# Patient Record
Sex: FEMALE | Race: WHITE | NOT HISPANIC OR LATINO
[De-identification: names, ages, dates, MRNs, and addresses within clinical notes are randomized per-mention and may not be internally consistent; named-entity substitution may affect disease eponyms.]

---

## 2017-01-19 ENCOUNTER — APPOINTMENT (OUTPATIENT)
Dept: UROGYNECOLOGY | Facility: CLINIC | Age: 47
End: 2017-01-19

## 2017-02-13 ENCOUNTER — APPOINTMENT (OUTPATIENT)
Dept: UROGYNECOLOGY | Facility: CLINIC | Age: 47
End: 2017-02-13

## 2017-02-13 VITALS
BODY MASS INDEX: 35.85 KG/M2 | WEIGHT: 210 LBS | HEIGHT: 64 IN | DIASTOLIC BLOOD PRESSURE: 70 MMHG | SYSTOLIC BLOOD PRESSURE: 110 MMHG

## 2017-02-13 DIAGNOSIS — R39.15 URGENCY OF URINATION: ICD-10-CM

## 2017-02-13 DIAGNOSIS — R35.0 FREQUENCY OF MICTURITION: ICD-10-CM

## 2017-03-16 ENCOUNTER — MEDICATION RENEWAL (OUTPATIENT)
Age: 47
End: 2017-03-16

## 2017-04-19 ENCOUNTER — RESULT CHARGE (OUTPATIENT)
Age: 47
End: 2017-04-19

## 2017-04-19 ENCOUNTER — APPOINTMENT (OUTPATIENT)
Dept: UROGYNECOLOGY | Facility: CLINIC | Age: 47
End: 2017-04-19

## 2017-04-19 VITALS
SYSTOLIC BLOOD PRESSURE: 125 MMHG | DIASTOLIC BLOOD PRESSURE: 75 MMHG | BODY MASS INDEX: 35.85 KG/M2 | HEIGHT: 64 IN | WEIGHT: 210 LBS

## 2017-04-19 RX ORDER — OXYBUTYNIN CHLORIDE 5 MG/1
5 TABLET, EXTENDED RELEASE ORAL
Qty: 30 | Refills: 0 | Status: COMPLETED | COMMUNITY
Start: 2017-02-13 | End: 2017-04-19

## 2017-06-26 ENCOUNTER — APPOINTMENT (OUTPATIENT)
Dept: OBGYN | Facility: CLINIC | Age: 47
End: 2017-06-26

## 2017-06-26 VITALS
SYSTOLIC BLOOD PRESSURE: 118 MMHG | BODY MASS INDEX: 36.19 KG/M2 | HEART RATE: 80 BPM | HEIGHT: 64 IN | WEIGHT: 212 LBS | DIASTOLIC BLOOD PRESSURE: 78 MMHG

## 2017-06-26 DIAGNOSIS — Z01.419 ENCOUNTER FOR GYNECOLOGICAL EXAMINATION (GENERAL) (ROUTINE) W/OUT ABNORMAL FINDINGS: ICD-10-CM

## 2017-06-26 LAB
HCG UR QL: NEGATIVE
QUALITY CONTROL: YES

## 2017-06-28 LAB — HPV HIGH+LOW RISK DNA PNL CVX: NEGATIVE

## 2017-07-03 LAB — CYTOLOGY CVX/VAG DOC THIN PREP: NORMAL

## 2017-08-29 ENCOUNTER — APPOINTMENT (OUTPATIENT)
Dept: UROGYNECOLOGY | Facility: CLINIC | Age: 47
End: 2017-08-29
Payer: COMMERCIAL

## 2017-08-29 VITALS
SYSTOLIC BLOOD PRESSURE: 102 MMHG | BODY MASS INDEX: 36.19 KG/M2 | HEIGHT: 64 IN | DIASTOLIC BLOOD PRESSURE: 68 MMHG | WEIGHT: 212 LBS

## 2017-08-29 LAB
BILIRUB UR QL STRIP: NEGATIVE
CLARITY UR: CLEAR
COLLECTION METHOD: NORMAL
GLUCOSE UR-MCNC: NEGATIVE
HCG UR QL: 0.2 EU/DL
HGB UR QL STRIP.AUTO: NORMAL
KETONES UR-MCNC: NEGATIVE
LEUKOCYTE ESTERASE UR QL STRIP: NORMAL
NITRITE UR QL STRIP: NEGATIVE
PH UR STRIP: 5.5
PROT UR STRIP-MCNC: NEGATIVE
SP GR UR STRIP: 1.02

## 2017-08-29 PROCEDURE — 51798 US URINE CAPACITY MEASURE: CPT

## 2017-08-29 PROCEDURE — 99213 OFFICE O/P EST LOW 20 MIN: CPT | Mod: 25

## 2017-08-29 PROCEDURE — 81003 URINALYSIS AUTO W/O SCOPE: CPT | Mod: QW

## 2017-08-29 RX ORDER — OXYBUTYNIN CHLORIDE 10 MG/1
10 TABLET, EXTENDED RELEASE ORAL
Qty: 30 | Refills: 3 | Status: DISCONTINUED | COMMUNITY
Start: 2017-04-19 | End: 2017-08-29

## 2017-11-15 ENCOUNTER — APPOINTMENT (OUTPATIENT)
Dept: UROGYNECOLOGY | Facility: CLINIC | Age: 47
End: 2017-11-15
Payer: COMMERCIAL

## 2017-11-15 VITALS
SYSTOLIC BLOOD PRESSURE: 106 MMHG | DIASTOLIC BLOOD PRESSURE: 72 MMHG | HEIGHT: 64 IN | BODY MASS INDEX: 36.19 KG/M2 | WEIGHT: 212 LBS

## 2017-11-15 LAB
BILIRUB UR QL STRIP: NEGATIVE
CLARITY UR: CLEAR
COLLECTION METHOD: NORMAL
GLUCOSE UR-MCNC: NEGATIVE
HCG UR QL: 0.2 EU/DL
HGB UR QL STRIP.AUTO: NEGATIVE
KETONES UR-MCNC: NEGATIVE
LEUKOCYTE ESTERASE UR QL STRIP: NEGATIVE
NITRITE UR QL STRIP: NEGATIVE
PH UR STRIP: 5.5
PROT UR STRIP-MCNC: NEGATIVE
SP GR UR STRIP: 1.03

## 2017-11-15 PROCEDURE — 51798 US URINE CAPACITY MEASURE: CPT

## 2017-11-15 PROCEDURE — 81003 URINALYSIS AUTO W/O SCOPE: CPT | Mod: QW

## 2017-11-15 PROCEDURE — 99213 OFFICE O/P EST LOW 20 MIN: CPT | Mod: 25

## 2020-05-19 ENCOUNTER — TRANSCRIPTION ENCOUNTER (OUTPATIENT)
Age: 50
End: 2020-05-19

## 2021-12-23 ENCOUNTER — APPOINTMENT (OUTPATIENT)
Dept: UROGYNECOLOGY | Facility: CLINIC | Age: 51
End: 2021-12-23
Payer: COMMERCIAL

## 2021-12-23 ENCOUNTER — RESULT CHARGE (OUTPATIENT)
Age: 51
End: 2021-12-23

## 2021-12-23 DIAGNOSIS — R32 UNSPECIFIED URINARY INCONTINENCE: ICD-10-CM

## 2021-12-23 DIAGNOSIS — R35.0 FREQUENCY OF MICTURITION: ICD-10-CM

## 2021-12-23 DIAGNOSIS — R39.15 URGENCY OF URINATION: ICD-10-CM

## 2021-12-23 DIAGNOSIS — F17.200 NICOTINE DEPENDENCE, UNSPECIFIED, UNCOMPLICATED: ICD-10-CM

## 2021-12-23 DIAGNOSIS — Z78.9 OTHER SPECIFIED HEALTH STATUS: ICD-10-CM

## 2021-12-23 LAB
BILIRUB UR QL STRIP: NEGATIVE
CLARITY UR: CLEAR
COLLECTION METHOD: NORMAL
GLUCOSE UR-MCNC: NEGATIVE
HCG UR QL: 0.2 EU/DL
HGB UR QL STRIP.AUTO: NEGATIVE
KETONES UR-MCNC: NEGATIVE
LEUKOCYTE ESTERASE UR QL STRIP: NEGATIVE
NITRITE UR QL STRIP: NEGATIVE
PH UR STRIP: 6
PROT UR STRIP-MCNC: NEGATIVE
SP GR UR STRIP: 1.02

## 2021-12-23 PROCEDURE — 99204 OFFICE O/P NEW MOD 45 MIN: CPT | Mod: 25

## 2021-12-23 PROCEDURE — 51798 US URINE CAPACITY MEASURE: CPT

## 2021-12-23 RX ORDER — BROMPHENIRAMINE MALEATE, PSEUDOEPHEDRINE HYDROCHLORIDE, 2; 30; 10 MG/5ML; MG/5ML; MG/5ML
30-2-10 SYRUP ORAL
Qty: 236 | Refills: 0 | Status: DISCONTINUED | COMMUNITY
Start: 2017-01-07 | End: 2021-12-23

## 2021-12-23 RX ORDER — PREDNISONE 20 MG/1
20 TABLET ORAL
Qty: 6 | Refills: 0 | Status: DISCONTINUED | COMMUNITY
Start: 2017-01-07 | End: 2021-12-23

## 2021-12-23 RX ORDER — AZITHROMYCIN 250 MG/1
250 TABLET, FILM COATED ORAL
Qty: 6 | Refills: 0 | Status: DISCONTINUED | COMMUNITY
Start: 2016-12-29 | End: 2021-12-23

## 2021-12-23 RX ORDER — FLUTICASONE PROPIONATE 50 UG/1
50 SPRAY, METERED NASAL
Qty: 16 | Refills: 0 | Status: DISCONTINUED | COMMUNITY
Start: 2017-01-07 | End: 2021-12-23

## 2021-12-23 RX ORDER — TOPIRAMATE 25 MG/1
25 TABLET, FILM COATED ORAL
Qty: 30 | Refills: 0 | Status: DISCONTINUED | COMMUNITY
Start: 2017-04-03 | End: 2021-12-23

## 2021-12-23 RX ORDER — OXYBUTYNIN CHLORIDE 15 MG/1
15 TABLET, EXTENDED RELEASE ORAL
Qty: 30 | Refills: 5 | Status: DISCONTINUED | COMMUNITY
Start: 2017-08-29 | End: 2021-12-23

## 2021-12-23 RX ORDER — ALBUTEROL SULFATE 90 UG/1
108 (90 BASE) AEROSOL, METERED RESPIRATORY (INHALATION)
Qty: 18 | Refills: 0 | Status: DISCONTINUED | COMMUNITY
Start: 2017-01-07 | End: 2021-12-23

## 2021-12-23 RX ORDER — ESTRADIOL 0.1 MG/G
0.1 CREAM VAGINAL
Qty: 1 | Refills: 2 | Status: ACTIVE | COMMUNITY
Start: 2021-12-23 | End: 1900-01-01

## 2021-12-23 RX ORDER — ALBUTEROL SULFATE 90 UG/1
AEROSOL, METERED RESPIRATORY (INHALATION)
Refills: 0 | Status: ACTIVE | COMMUNITY

## 2021-12-23 NOTE — DISCUSSION/SUMMARY
[FreeTextEntry1] : Ms. PARISI is 51 with mild midline cystocele, rectocele, urinary frequency, urgency, leakage of urine, vaginal atrophy and discomfort with intercourse.  We discussed the degree of her prolapse and the significance of vaginal atrophy as it associates with the discomfort she is on feeling.  We discussed the systemic side effects of a small amount of vaginal estrogen and I sent a prescription to her pharmacy.  I referred her for pelvic floor physical therapy and asked her to come back and see me in about 3 months to reevaluate her.  I also ordered a pelvic sonogram to evaluate her internal pelvic organs.  I was able to answer all of her questions.

## 2021-12-23 NOTE — LETTER BODY
[Dear  ___] : Dear ~SIRENA, [I had the pleasure of evaluating your patient, [unfilled]. Thank you for referring Ms. [unfilled] for consultation for ___] : I had the pleasure of evaluating your patient, [unfilled]. Thank you for referring Ms. [unfilled] for consultation for [unfilled]. [Attached please find my note.] : Attached please find my note. [Thank you very much for allowing me to participate in the care of this patient. If you have any questions, please do not hesitate to contact me] : Thank you very much for allowing me to participate in the care of this patient. If you have any questions, please do not hesitate to contact me. [DrJ Carlos  ___] : Dr. COOK

## 2021-12-23 NOTE — PHYSICAL EXAM
[Chaperone Present] : A chaperone was present in the examining room during all aspects of the physical examination [No Acute Distress] : in no acute distress [Well developed] : well developed [Well Nourished] : ~L well nourished [Oriented x3] : oriented to person, place, and time [No Edema] : ~T edema was not present [Warm and Dry] : was warm and dry to touch [Normal Gait] : gait was normal [Normal Appearance] : general appearance was normal [Aa ____] : Aa [unfilled] [Ba ____] : Ba [unfilled] [C ____] : C [unfilled] [GH ____] : GH [unfilled] [PB ____] : PB [unfilled] [TVL ____] : TVL  [unfilled] [Ap ____] : Ap [unfilled] [Bp ____] : Bp [unfilled] [D ____] : D [unfilled] [Normal] : no abnormalities [Cough] : no cough [Tenderness] : ~T no ~M abdominal tenderness observed [Distended] : not distended [Hernia] : no hernia observed

## 2021-12-23 NOTE — HISTORY OF PRESENT ILLNESS
[FreeTextEntry1] : 52 yo  female with complaints of prolapse. She first noticed about a year ago. She had an episode in October where she could not have a BM. She had to push so hard and it got worse after this. She is also having discomfort with intercourse. Saw GYN and told her bladder was dropping. She sometimes feels some difficulty emptying her bladder. She is still using Impressa when she exercises. Is not longer taking meds for OAB. To some degree the urgency and frequency has improved. She lost 40 lbs with dieting. She has some vaginal dryness. SHe used to have IBS and loose stool, now she is more constipated.

## 2021-12-23 NOTE — OB HISTORY
[Vaginal ___] : [unfilled] vaginal delivery(s) [Last Pap Smear ___] : date of last pap smear was on [unfilled] [Sexually Active] : sexually active [Approximately ___ (Month)] : the LMP was approximately [unfilled] month(s) ago [Abnormal Pap Smear] : normal pap smear [Taking Estrogens] : is not taking estrogen replacement [FreeTextEntry1] : Largest baby 8#.

## 2021-12-23 NOTE — REASON FOR VISIT
[Urinary Incontinence] : urinary incontinence [Pelvic Organ Prolapse] : pelvic organ prolapse [Problems With Defecation] : problems with defecation

## 2022-05-16 ENCOUNTER — APPOINTMENT (OUTPATIENT)
Dept: UROGYNECOLOGY | Facility: CLINIC | Age: 52
End: 2022-05-16
Payer: COMMERCIAL

## 2022-05-16 DIAGNOSIS — R32 UNSPECIFIED URINARY INCONTINENCE: ICD-10-CM

## 2022-05-16 PROCEDURE — 51798 US URINE CAPACITY MEASURE: CPT

## 2022-05-16 PROCEDURE — 99213 OFFICE O/P EST LOW 20 MIN: CPT | Mod: 25

## 2022-05-16 NOTE — HISTORY OF PRESENT ILLNESS
[FreeTextEntry1] : Syeda has mild to moderate prolapse and pain with intercourse. She was given estrogen cream and a referral for PT at her last visit. She also had a normal pelvic U/S in February. She feels some improvement after PT. No change in the discomfort with intercourse. Has a loop recorder for tachycardia. No change in her urinary habits nor has her bowel function. Has been doing PT for about 6 weeks. Does not feel the prolapse is any better or worse. She was using a dilator. She feels more flexibility in her pelvic floor.

## 2022-05-16 NOTE — DISCUSSION/SUMMARY
[FreeTextEntry1] : Stable mild prolapse. Still having discomfort with intercourse. I encouraged her to continue with PT. Followup in 6 months.

## 2022-08-02 ENCOUNTER — NON-APPOINTMENT (OUTPATIENT)
Age: 52
End: 2022-08-02

## 2022-09-04 ENCOUNTER — NON-APPOINTMENT (OUTPATIENT)
Age: 52
End: 2022-09-04

## 2022-11-03 ENCOUNTER — NON-APPOINTMENT (OUTPATIENT)
Age: 52
End: 2022-11-03

## 2022-11-14 ENCOUNTER — RESULT CHARGE (OUTPATIENT)
Age: 52
End: 2022-11-14

## 2022-11-14 ENCOUNTER — APPOINTMENT (OUTPATIENT)
Dept: UROGYNECOLOGY | Facility: CLINIC | Age: 52
End: 2022-11-14
Payer: COMMERCIAL

## 2022-11-14 VITALS
DIASTOLIC BLOOD PRESSURE: 69 MMHG | HEIGHT: 64 IN | SYSTOLIC BLOOD PRESSURE: 106 MMHG | WEIGHT: 177 LBS | BODY MASS INDEX: 30.22 KG/M2

## 2022-11-14 DIAGNOSIS — N81.9 FEMALE GENITAL PROLAPSE, UNSPECIFIED: ICD-10-CM

## 2022-11-14 DIAGNOSIS — N95.2 POSTMENOPAUSAL ATROPHIC VAGINITIS: ICD-10-CM

## 2022-11-14 DIAGNOSIS — R37 SEXUAL DYSFUNCTION, UNSPECIFIED: ICD-10-CM

## 2022-11-14 LAB
BILIRUB UR QL STRIP: NEGATIVE
CLARITY UR: CLEAR
COLLECTION METHOD: NORMAL
GLUCOSE UR-MCNC: NEGATIVE
HCG UR QL: 0.2 EU/DL
HGB UR QL STRIP.AUTO: NEGATIVE
KETONES UR-MCNC: NEGATIVE
LEUKOCYTE ESTERASE UR QL STRIP: NEGATIVE
NITRITE UR QL STRIP: NEGATIVE
PH UR STRIP: 5.5
PROT UR STRIP-MCNC: NEGATIVE
SP GR UR STRIP: 1.02

## 2022-11-14 PROCEDURE — 99213 OFFICE O/P EST LOW 20 MIN: CPT | Mod: 25

## 2022-11-14 PROCEDURE — 51798 US URINE CAPACITY MEASURE: CPT

## 2022-11-14 PROCEDURE — 81003 URINALYSIS AUTO W/O SCOPE: CPT | Mod: QW

## 2022-11-14 NOTE — HISTORY OF PRESENT ILLNESS
[FreeTextEntry1] : Syeda has mild to moderate prolapse that has been stable. She also has some discomfort with intercourse. She had a trial with PT and after 6 weeks didn't feel much difference in her symptoms. She presents today for followup. She had continued with PT and does not feel it made a big difference. She does have to splint to have a BM. She continues to have trouble with intercourse. She feels something rubbing her underwear. She is using impressa when she works out. Has not been using estrogen cream.

## 2022-11-14 NOTE — DISCUSSION/SUMMARY
[FreeTextEntry1] : Slightly worsening of rectocele. We discussed her pelvic muscle spasm as the likely cause of her pain. We talked about treatment options. I sent an Rx for 5mg valium suppositories to be compounded. We discussed what would be involved in a posterior repair. We talked about risk of worsening dyspareunia. I encourage her to continue with the estrogen cream. I was able to answer all of her questions. i asked her to comeback in 3 months. I told her she can call for refills on the valium if it helps.

## 2023-05-06 ENCOUNTER — NON-APPOINTMENT (OUTPATIENT)
Age: 53
End: 2023-05-06

## 2023-05-12 ENCOUNTER — NON-APPOINTMENT (OUTPATIENT)
Age: 53
End: 2023-05-12

## 2023-05-23 ENCOUNTER — APPOINTMENT (OUTPATIENT)
Dept: UROGYNECOLOGY | Facility: CLINIC | Age: 53
End: 2023-05-23

## 2023-10-27 ENCOUNTER — APPOINTMENT (OUTPATIENT)
Dept: ORTHOPEDIC SURGERY | Facility: CLINIC | Age: 53
End: 2023-10-27
Payer: COMMERCIAL

## 2023-10-27 DIAGNOSIS — M77.8 OTHER ENTHESOPATHIES, NOT ELSEWHERE CLASSIFIED: ICD-10-CM

## 2023-10-27 DIAGNOSIS — Z87.898 PERSONAL HISTORY OF OTHER SPECIFIED CONDITIONS: ICD-10-CM

## 2023-10-27 DIAGNOSIS — M70.52 OTHER BURSITIS OF KNEE, LEFT KNEE: ICD-10-CM

## 2023-10-27 PROCEDURE — 99213 OFFICE O/P EST LOW 20 MIN: CPT

## 2023-10-27 PROCEDURE — 73110 X-RAY EXAM OF WRIST: CPT | Mod: RT

## 2023-10-27 PROCEDURE — 73564 X-RAY EXAM KNEE 4 OR MORE: CPT | Mod: LT

## 2023-10-31 ENCOUNTER — RESULT REVIEW (OUTPATIENT)
Age: 53
End: 2023-10-31

## 2023-11-09 ENCOUNTER — APPOINTMENT (OUTPATIENT)
Dept: ORTHOPEDIC SURGERY | Facility: CLINIC | Age: 53
End: 2023-11-09
Payer: COMMERCIAL

## 2023-11-09 VITALS — HEIGHT: 64 IN | WEIGHT: 177 LBS | BODY MASS INDEX: 30.22 KG/M2

## 2023-11-09 DIAGNOSIS — M71.22 SYNOVIAL CYST OF POPLITEAL SPACE [BAKER], LEFT KNEE: ICD-10-CM

## 2023-11-09 PROCEDURE — 99214 OFFICE O/P EST MOD 30 MIN: CPT

## 2023-11-09 RX ORDER — MONTELUKAST SODIUM 10 MG/1
10 TABLET, FILM COATED ORAL
Refills: 0 | Status: ACTIVE | COMMUNITY

## 2023-11-09 RX ORDER — DIAZEPAM 100 %
POWDER (GRAM) MISCELLANEOUS
Qty: 30 | Refills: 0 | Status: DISCONTINUED | COMMUNITY
Start: 2022-11-14 | End: 2023-11-09

## 2023-11-09 RX ORDER — FLUTICASONE FUROATE, UMECLIDINIUM BROMIDE AND VILANTEROL TRIFENATATE 100; 62.5; 25 UG/1; UG/1; UG/1
100-62.5-25 POWDER RESPIRATORY (INHALATION)
Refills: 0 | Status: ACTIVE | COMMUNITY

## 2023-11-20 ENCOUNTER — APPOINTMENT (OUTPATIENT)
Dept: ORTHOPEDIC SURGERY | Facility: CLINIC | Age: 53
End: 2023-11-20

## 2024-10-12 ENCOUNTER — OFFICE (OUTPATIENT)
Dept: URBAN - METROPOLITAN AREA CLINIC 113 | Facility: CLINIC | Age: 54
Setting detail: OPHTHALMOLOGY
End: 2024-10-12
Payer: COMMERCIAL

## 2024-10-12 DIAGNOSIS — H52.7: ICD-10-CM

## 2024-10-12 DIAGNOSIS — H11.153: ICD-10-CM

## 2024-10-12 DIAGNOSIS — H43.392: ICD-10-CM

## 2024-10-12 PROBLEM — Z83.511 FAMILY HISTORY OF GLAUCOMA: Status: ACTIVE | Noted: 2024-10-12

## 2024-10-12 PROCEDURE — 92015 DETERMINE REFRACTIVE STATE: CPT | Performed by: OPTOMETRIST

## 2024-10-12 PROCEDURE — 92014 COMPRE OPH EXAM EST PT 1/>: CPT | Performed by: OPTOMETRIST

## 2024-10-12 PROCEDURE — 92250 FUNDUS PHOTOGRAPHY W/I&R: CPT | Performed by: OPTOMETRIST

## 2024-10-12 ASSESSMENT — REFRACTION_CURRENTRX
OS_AXIS: 067
OD_CYLINDER: 0.00
OS_SPHERE: +1.50
OS_ADD: +2.00
OD_SPHERE: +1.25
OD_CYLINDER: SPH
OS_OVR_VA: 20/
OD_OVR_VA: 20/
OD_VPRISM_DIRECTION: PROGS
OS_ADD: +2.00
OD_AXIS: 180
OD_VPRISM_DIRECTION: PROGS
OS_AXIS: 090
OD_CYLINDER: -0.25
OS_CYLINDER: -0.50
OS_SPHERE: +3.00
OS_OVR_VA: 20/
OS_CYLINDER: -0.50
OD_VPRISM_DIRECTION: SV
OS_CYLINDER: -0.25
OD_ADD: +2.00
OS_SPHERE: +1.75
OD_ADD: +2.00
OS_AXIS: 071
OD_OVR_VA: 20/
OS_VPRISM_DIRECTION: PROGS
OD_OVR_VA: 20/
OD_AXIS: 180
OD_SPHERE: +2.50
OS_OVR_VA: 20/
OS_VPRISM_DIRECTION: PROGS
OS_VPRISM_DIRECTION: SV
OD_AXIS: 013
OD_SPHERE: +1.25

## 2024-10-12 ASSESSMENT — KERATOMETRY
OS_K1POWER_DIOPTERS: 44.00
OD_AXISANGLE_DEGREES: 074
OS_AXISANGLE_DEGREES: 091
OD_K2POWER_DIOPTERS: 44.25
OD_K1POWER_DIOPTERS: 43.75
OS_K2POWER_DIOPTERS: 44.25

## 2024-10-12 ASSESSMENT — REFRACTION_MANIFEST
OS_SPHERE: +3.25
OD_SPHERE: +3.00
OD_CYLINDER: SPH
OD_VA1: 20/20
OS_CYLINDER: -0.50
OD_AXIS: 020
OS_ADD: +2.25
OS_SPHERE: +1.50
OD_ADD: +2.25
OS_VA1: 20/20
OS_AXIS: 075
OD_CYLINDER: -0.50
OD_SPHERE: +1.25
OS_CYLINDER: -0.50
OS_AXIS: 070

## 2024-10-12 ASSESSMENT — TONOMETRY
OS_IOP_MMHG: 16
OD_IOP_MMHG: 16
OS_IOP_MMHG: 19
OD_IOP_MMHG: 21

## 2024-10-12 ASSESSMENT — REFRACTION_AUTOREFRACTION
OD_SPHERE: +1.25
OD_CYLINDER: -0.25
OS_SPHERE: +1.75
OD_AXIS: 018
OS_CYLINDER: -0.75
OS_AXIS: 083

## 2024-10-12 ASSESSMENT — VISUAL ACUITY
OS_BCVA: 20/25+
OD_BCVA: 20/20-

## 2024-10-12 ASSESSMENT — CONFRONTATIONAL VISUAL FIELD TEST (CVF)
OS_FINDINGS: FULL
OD_FINDINGS: FULL

## 2025-01-20 ENCOUNTER — NON-APPOINTMENT (OUTPATIENT)
Age: 55
End: 2025-01-20